# Patient Record
Sex: FEMALE | Race: ASIAN | NOT HISPANIC OR LATINO | URBAN - METROPOLITAN AREA
[De-identification: names, ages, dates, MRNs, and addresses within clinical notes are randomized per-mention and may not be internally consistent; named-entity substitution may affect disease eponyms.]

---
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
## 2019-11-06 ENCOUNTER — APPOINTMENT (OUTPATIENT)
Dept: FAMILY MEDICINE | Facility: CLINIC | Age: 19
End: 2019-11-06
Payer: MEDICAID

## 2019-11-06 VITALS
DIASTOLIC BLOOD PRESSURE: 70 MMHG | OXYGEN SATURATION: 99 % | TEMPERATURE: 98 F | HEIGHT: 63 IN | SYSTOLIC BLOOD PRESSURE: 100 MMHG | BODY MASS INDEX: 22.86 KG/M2 | RESPIRATION RATE: 14 BRPM | WEIGHT: 129 LBS | HEART RATE: 94 BPM

## 2019-11-06 DIAGNOSIS — Z78.9 OTHER SPECIFIED HEALTH STATUS: ICD-10-CM

## 2019-11-06 DIAGNOSIS — Z82.49 FAMILY HISTORY OF ISCHEMIC HEART DISEASE AND OTHER DISEASES OF THE CIRCULATORY SYSTEM: ICD-10-CM

## 2019-11-06 DIAGNOSIS — Z87.09 PERSONAL HISTORY OF OTHER DISEASES OF THE RESPIRATORY SYSTEM: ICD-10-CM

## 2019-11-06 LAB
HCG UR QL: NEGATIVE
QUALITY CONTROL: YES
S PYO AG SPEC QL IA: NEGATIVE

## 2019-11-06 PROCEDURE — 99385 PREV VISIT NEW AGE 18-39: CPT | Mod: 25

## 2019-11-06 PROCEDURE — 87880 STREP A ASSAY W/OPTIC: CPT | Mod: QW

## 2019-11-06 PROCEDURE — 81025 URINE PREGNANCY TEST: CPT

## 2019-11-06 RX ORDER — CLINDAMYCIN 1 G/10ML
1 GEL TOPICAL
Refills: 0 | Status: ACTIVE | COMMUNITY

## 2019-11-11 LAB — BACTERIA THROAT CULT: NORMAL

## 2020-03-05 ENCOUNTER — TRANSCRIPTION ENCOUNTER (OUTPATIENT)
Age: 20
End: 2020-03-05

## 2020-03-05 ENCOUNTER — APPOINTMENT (OUTPATIENT)
Dept: FAMILY MEDICINE | Facility: CLINIC | Age: 20
End: 2020-03-05
Payer: MEDICAID

## 2020-03-05 VITALS
BODY MASS INDEX: 22.15 KG/M2 | DIASTOLIC BLOOD PRESSURE: 90 MMHG | HEIGHT: 63 IN | RESPIRATION RATE: 14 BRPM | WEIGHT: 125 LBS | HEART RATE: 86 BPM | TEMPERATURE: 97.7 F | SYSTOLIC BLOOD PRESSURE: 138 MMHG | OXYGEN SATURATION: 98 %

## 2020-03-05 DIAGNOSIS — Z23 ENCOUNTER FOR IMMUNIZATION: ICD-10-CM

## 2020-03-05 PROCEDURE — 90714 TD VACC NO PRESV 7 YRS+ IM: CPT

## 2020-03-05 PROCEDURE — 90471 IMMUNIZATION ADMIN: CPT

## 2020-03-05 PROCEDURE — 99213 OFFICE O/P EST LOW 20 MIN: CPT | Mod: 25

## 2020-06-18 ENCOUNTER — TRANSCRIPTION ENCOUNTER (OUTPATIENT)
Age: 20
End: 2020-06-18

## 2020-09-23 ENCOUNTER — TRANSCRIPTION ENCOUNTER (OUTPATIENT)
Age: 20
End: 2020-09-23

## 2020-11-12 ENCOUNTER — APPOINTMENT (OUTPATIENT)
Dept: FAMILY MEDICINE | Facility: CLINIC | Age: 20
End: 2020-11-12
Payer: MEDICAID

## 2020-11-12 VITALS
SYSTOLIC BLOOD PRESSURE: 112 MMHG | BODY MASS INDEX: 22.15 KG/M2 | WEIGHT: 125 LBS | OXYGEN SATURATION: 98 % | DIASTOLIC BLOOD PRESSURE: 74 MMHG | TEMPERATURE: 98.1 F | HEART RATE: 89 BPM | RESPIRATION RATE: 18 BRPM | HEIGHT: 63 IN

## 2020-11-12 DIAGNOSIS — F41.9 ANXIETY DISORDER, UNSPECIFIED: ICD-10-CM

## 2020-11-12 DIAGNOSIS — L70.9 ACNE, UNSPECIFIED: ICD-10-CM

## 2020-11-12 DIAGNOSIS — Z00.00 ENCOUNTER FOR GENERAL ADULT MEDICAL EXAMINATION W/OUT ABNORMAL FINDINGS: ICD-10-CM

## 2020-11-12 DIAGNOSIS — Z81.8 FAMILY HISTORY OF OTHER MENTAL AND BEHAVIORAL DISORDERS: ICD-10-CM

## 2020-11-12 DIAGNOSIS — Z83.79 FAMILY HISTORY OF OTHER DISEASES OF THE DIGESTIVE SYSTEM: ICD-10-CM

## 2020-11-12 DIAGNOSIS — F32.9 MAJOR DEPRESSIVE DISORDER, SINGLE EPISODE, UNSPECIFIED: ICD-10-CM

## 2020-11-12 PROCEDURE — 99395 PREV VISIT EST AGE 18-39: CPT | Mod: 25

## 2020-11-12 PROCEDURE — G0442 ANNUAL ALCOHOL SCREEN 15 MIN: CPT | Mod: NC

## 2020-11-12 PROCEDURE — G0444 DEPRESSION SCREEN ANNUAL: CPT | Mod: NC,59

## 2020-11-12 PROCEDURE — 99072 ADDL SUPL MATRL&STAF TM PHE: CPT

## 2020-11-12 RX ORDER — BUSPIRONE HYDROCHLORIDE 10 MG/1
10 TABLET ORAL TWICE DAILY
Qty: 60 | Refills: 0 | Status: ACTIVE | COMMUNITY
Start: 2020-11-12 | End: 1900-01-01

## 2020-11-12 NOTE — HISTORY OF PRESENT ILLNESS
[FreeTextEntry1] : 19 yo female presents to the office for a physical. 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 she reports feeling an increase in anxiety and depression, centered around her school, parents, significant other. She denies any suicidal/homicidal ideations, but states feeling overwhelmed and anxious most days. She has friends she can confide in, but feels that it's not enough. She has been thinking about making an appointment with the mental health clinic at school, but has yet to do so.

## 2020-11-12 NOTE — PHYSICAL EXAM
[Declined Breast Exam] : declined breast exam  [Normal Supraclavicular Nodes] : no supraclavicular lymphadenopathy [Coordination Grossly Intact] : coordination grossly intact [No Focal Deficits] : no focal deficits [Normal Gait] : normal gait [Speech Grossly Normal] : speech grossly normal [Alert and Oriented x3] : oriented to person, place, and time [Normal Mood] : the mood was normal [Normal] : affect was normal and insight and judgment were intact

## 2020-11-12 NOTE — PAST MEDICAL HISTORY
[Menstruating] : menstruating [Approximately ___] : the LMP was approximately [unfilled] [Normal Amount/Duration] : it was of a normal amount and duration [Regular Cycle Intervals] : have been regular

## 2020-11-12 NOTE — HEALTH RISK ASSESSMENT
[Fair] :  ~his/her~ mood as fair [No] : In the past 12 months have you used drugs other than those required for medical reasons? No [1] : 1) Little interest or pleasure doing things for several days (1) [None] : None [With Family] : lives with family [# of Members in Household ___] :  household currently consist of [unfilled] member(s) [Employed] : employed [Student] : student [Significant Other] : lives with significant other [Sexually Active] : sexually active [Feels Safe at Home] : Feels safe at home [Fully functional (bathing, dressing, toileting, transferring, walking, feeding)] : Fully functional (bathing, dressing, toileting, transferring, walking, feeding) [Fully functional (using the telephone, shopping, preparing meals, housekeeping, doing laundry, using] : Fully functional and needs no help or supervision to perform IADLs (using the telephone, shopping, preparing meals, housekeeping, doing laundry, using transportation, managing medications and managing finances) [Reports normal functional visual acuity (ie: able to read med bottle)] : Reports normal functional visual acuity [No falls in past year] : Patient reported no falls in the past year [3] : 2) Feeling down, depressed, or hopeless for nearly every day (3) [FreeTextEntry1] : see hpi [] : No [de-identified] : regular  [de-identified] : healthy [[id number]] : 4 [PSV8Cwjxa] : 12 [High Risk Behavior] : no high risk behavior [Reports changes in hearing] : Reports no changes in hearing [Reports changes in vision] : Reports no changes in vision [PapSmearComments] : appointment in december [de-identified] : mother, father, younger 10yo sister [FreeTextEntry2] : 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, working at [employer]

## 2020-11-25 DIAGNOSIS — Z28.3 UNDERIMMUNIZATION STATUS: ICD-10-CM

## 2020-12-01 ENCOUNTER — NON-APPOINTMENT (OUTPATIENT)
Age: 20
End: 2020-12-01

## 2020-12-03 ENCOUNTER — APPOINTMENT (OUTPATIENT)
Dept: OBGYN | Facility: CLINIC | Age: 20
End: 2020-12-03

## 2020-12-16 ENCOUNTER — NON-APPOINTMENT (OUTPATIENT)
Age: 20
End: 2020-12-16

## 2020-12-18 ENCOUNTER — NON-APPOINTMENT (OUTPATIENT)
Age: 20
End: 2020-12-18

## 2020-12-18 ENCOUNTER — APPOINTMENT (OUTPATIENT)
Dept: FAMILY MEDICINE | Facility: CLINIC | Age: 20
End: 2020-12-18
Payer: MEDICAID

## 2020-12-18 DIAGNOSIS — Z23 ENCOUNTER FOR IMMUNIZATION: ICD-10-CM

## 2020-12-18 PROCEDURE — 90471 IMMUNIZATION ADMIN: CPT

## 2020-12-18 PROCEDURE — 99072 ADDL SUPL MATRL&STAF TM PHE: CPT

## 2020-12-18 PROCEDURE — 90707 MMR VACCINE SC: CPT

## 2020-12-21 PROBLEM — Z87.09 HISTORY OF SORE THROAT: Status: RESOLVED | Noted: 2019-11-06 | Resolved: 2020-12-21

## 2021-01-04 ENCOUNTER — TRANSCRIPTION ENCOUNTER (OUTPATIENT)
Age: 21
End: 2021-01-04

## 2021-01-06 ENCOUNTER — TRANSCRIPTION ENCOUNTER (OUTPATIENT)
Age: 21
End: 2021-01-06

## 2021-01-12 ENCOUNTER — APPOINTMENT (OUTPATIENT)
Dept: FAMILY MEDICINE | Facility: CLINIC | Age: 21
End: 2021-01-12
Payer: MEDICAID

## 2021-01-12 PROCEDURE — 99442: CPT

## 2021-01-12 RX ORDER — SERTRALINE 25 MG/1
25 TABLET, FILM COATED ORAL
Qty: 30 | Refills: 2 | Status: ACTIVE | COMMUNITY
Start: 2021-01-12 | End: 1900-01-01

## 2021-01-12 NOTE — HISTORY OF PRESENT ILLNESS
[Home] : at home, [unfilled] , at the time of the visit. [Medical Office: ([facility])___] : at the medical office located in  [Verbal consent obtained from patient] : the patient, [unfilled] [FreeTextEntry1] : 19 yo female with anxiety (buspar) presents for a telephonic visit to discuss worsening anxiety and depression. [de-identified] : The patient reports that she feels the buspar is no longer helping in the way that she needs. she states feeling an increase in her anxiety, and now feeling "down" and depressed. she is currently in therapy via zoom once a week, and feels that she has a good support system from with her friends. She feels that therapy is very helpful, and she is practicing meditation and mindfulness daily. she does states feeling more anxious, and having a panic attack 1-2 times a week. She denies any suicidal/homicidal ideations, and states that she still engages in physical and social activities, when possible.

## 2021-01-12 NOTE — REVIEW OF SYSTEMS
[Anxiety] : anxiety [Depression] : depression [Negative] : Constitutional [Suicidal] : not suicidal [Insomnia] : no insomnia

## 2021-02-04 ENCOUNTER — TRANSCRIPTION ENCOUNTER (OUTPATIENT)
Age: 21
End: 2021-02-04

## 2021-02-09 ENCOUNTER — TRANSCRIPTION ENCOUNTER (OUTPATIENT)
Age: 21
End: 2021-02-09

## 2021-03-08 ENCOUNTER — TRANSCRIPTION ENCOUNTER (OUTPATIENT)
Age: 21
End: 2021-03-08

## 2021-03-08 RX ORDER — ALPRAZOLAM 0.25 MG/1
0.25 TABLET ORAL
Qty: 7 | Refills: 0 | Status: ACTIVE | COMMUNITY
Start: 2021-01-12 | End: 1900-01-01

## 2021-03-12 ENCOUNTER — TRANSCRIPTION ENCOUNTER (OUTPATIENT)
Age: 21
End: 2021-03-12

## 2021-03-25 ENCOUNTER — TRANSCRIPTION ENCOUNTER (OUTPATIENT)
Age: 21
End: 2021-03-25

## 2021-03-29 ENCOUNTER — APPOINTMENT (OUTPATIENT)
Dept: FAMILY MEDICINE | Facility: CLINIC | Age: 21
End: 2021-03-29
Payer: MEDICAID

## 2021-03-29 DIAGNOSIS — F41.9 ANXIETY DISORDER, UNSPECIFIED: ICD-10-CM

## 2021-03-29 DIAGNOSIS — F32.9 ANXIETY DISORDER, UNSPECIFIED: ICD-10-CM

## 2021-03-29 PROCEDURE — 99443: CPT

## 2021-03-29 RX ORDER — SERTRALINE HYDROCHLORIDE 50 MG/1
50 TABLET, FILM COATED ORAL DAILY
Qty: 30 | Refills: 5 | Status: ACTIVE | COMMUNITY
Start: 2021-03-29 | End: 1900-01-01

## 2021-03-30 PROBLEM — F41.9 ANXIETY AND DEPRESSION: Status: ACTIVE | Noted: 2021-01-12

## 2021-03-30 NOTE — HISTORY OF PRESENT ILLNESS
[Home] : at home, [unfilled] , at the time of the visit. [Medical Office: ([facility])___] : at the medical office located in  [Verbal consent obtained from patient] : the patient, [unfilled] [FreeTextEntry8] : 20 yo female with a PMHx anxiety/depression (sertraline, alprazolam) presents for a telephonic visit to discuss anxiety and depression. the patient reports that her symptoms of anxiety have lessened, but she is feeling more depressed lately. she reports that she got into a fight with her parents today, and she was kicked out of her home. She has contacted some close friends, and states that they will allow her to stay with them if/when she needs. she reports feeling an increase in depression before the incident with her parents today, but that this is really "bringing me down" she admits to passive thoughts of hurting herself, denies any self harm, or any plan to do so. the patient is currently in therapy via telehealth once a week, and she already spoke with her therapist this morning. She is planning to call the therapist back after our current appointment, and reach out to the emergency help line for guidance/assistance.

## 2021-04-02 ENCOUNTER — TRANSCRIPTION ENCOUNTER (OUTPATIENT)
Age: 21
End: 2021-04-02

## 2021-04-17 ENCOUNTER — EMERGENCY (EMERGENCY)
Facility: HOSPITAL | Age: 21
LOS: 1 days | Discharge: HOME | End: 2021-04-17
Attending: EMERGENCY MEDICINE
Payer: MEDICAID

## 2021-04-17 VITALS
TEMPERATURE: 99 F | RESPIRATION RATE: 18 BRPM | SYSTOLIC BLOOD PRESSURE: 145 MMHG | DIASTOLIC BLOOD PRESSURE: 88 MMHG | HEART RATE: 88 BPM | OXYGEN SATURATION: 99 %

## 2021-04-17 VITALS
DIASTOLIC BLOOD PRESSURE: 98 MMHG | TEMPERATURE: 99 F | HEART RATE: 88 BPM | SYSTOLIC BLOOD PRESSURE: 151 MMHG | OXYGEN SATURATION: 98 % | RESPIRATION RATE: 18 BRPM

## 2021-04-17 DIAGNOSIS — F32.9 MAJOR DEPRESSIVE DISORDER, SINGLE EPISODE, UNSPECIFIED: ICD-10-CM

## 2021-04-17 LAB
ANION GAP SERPL CALC-SCNC: 12 MMOL/L — SIGNIFICANT CHANGE UP (ref 5–17)
APAP SERPL-MCNC: <15 UG/ML — SIGNIFICANT CHANGE UP (ref 10–30)
APPEARANCE UR: CLEAR — SIGNIFICANT CHANGE UP
BASOPHILS # BLD AUTO: 0.03 K/UL — SIGNIFICANT CHANGE UP (ref 0–0.2)
BASOPHILS NFR BLD AUTO: 0.2 % — SIGNIFICANT CHANGE UP (ref 0–2)
BILIRUB UR-MCNC: NEGATIVE — SIGNIFICANT CHANGE UP
BUN SERPL-MCNC: 10 MG/DL — SIGNIFICANT CHANGE UP (ref 7–23)
CALCIUM SERPL-MCNC: 9 MG/DL — SIGNIFICANT CHANGE UP (ref 8.4–10.5)
CHLORIDE SERPL-SCNC: 99 MMOL/L — SIGNIFICANT CHANGE UP (ref 96–108)
CO2 SERPL-SCNC: 23 MMOL/L — SIGNIFICANT CHANGE UP (ref 22–31)
COLOR SPEC: SIGNIFICANT CHANGE UP
COVID-19 SPIKE DOMAIN AB INTERP: NEGATIVE — SIGNIFICANT CHANGE UP
COVID-19 SPIKE DOMAIN ANTIBODY RESULT: 0.4 U/ML — SIGNIFICANT CHANGE UP
CREAT SERPL-MCNC: 0.53 MG/DL — SIGNIFICANT CHANGE UP (ref 0.5–1.3)
DIFF PNL FLD: NEGATIVE — SIGNIFICANT CHANGE UP
EOSINOPHIL # BLD AUTO: 0.02 K/UL — SIGNIFICANT CHANGE UP (ref 0–0.5)
EOSINOPHIL NFR BLD AUTO: 0.1 % — SIGNIFICANT CHANGE UP (ref 0–6)
ETHANOL SERPL-MCNC: SIGNIFICANT CHANGE UP MG/DL (ref 0–10)
GLUCOSE SERPL-MCNC: 115 MG/DL — HIGH (ref 70–99)
GLUCOSE UR QL: NEGATIVE — SIGNIFICANT CHANGE UP
HCG SERPL-ACNC: <2 MIU/ML — SIGNIFICANT CHANGE UP
HCT VFR BLD CALC: 42.2 % — SIGNIFICANT CHANGE UP (ref 34.5–45)
HGB BLD-MCNC: 13.6 G/DL — SIGNIFICANT CHANGE UP (ref 11.5–15.5)
IMM GRANULOCYTES NFR BLD AUTO: 0.5 % — SIGNIFICANT CHANGE UP (ref 0–1.5)
KETONES UR-MCNC: NEGATIVE — SIGNIFICANT CHANGE UP
LEUKOCYTE ESTERASE UR-ACNC: NEGATIVE — SIGNIFICANT CHANGE UP
LYMPHOCYTES # BLD AUTO: 19.2 % — SIGNIFICANT CHANGE UP (ref 13–44)
LYMPHOCYTES # BLD AUTO: 2.59 K/UL — SIGNIFICANT CHANGE UP (ref 1–3.3)
MCHC RBC-ENTMCNC: 28.7 PG — SIGNIFICANT CHANGE UP (ref 27–34)
MCHC RBC-ENTMCNC: 32.2 GM/DL — SIGNIFICANT CHANGE UP (ref 32–36)
MCV RBC AUTO: 89 FL — SIGNIFICANT CHANGE UP (ref 80–100)
MONOCYTES # BLD AUTO: 0.72 K/UL — SIGNIFICANT CHANGE UP (ref 0–0.9)
MONOCYTES NFR BLD AUTO: 5.3 % — SIGNIFICANT CHANGE UP (ref 2–14)
NEUTROPHILS # BLD AUTO: 10.04 K/UL — HIGH (ref 1.8–7.4)
NEUTROPHILS NFR BLD AUTO: 74.7 % — SIGNIFICANT CHANGE UP (ref 43–77)
NITRITE UR-MCNC: NEGATIVE — SIGNIFICANT CHANGE UP
NRBC # BLD: 0 /100 WBCS — SIGNIFICANT CHANGE UP (ref 0–0)
PCP SPEC-MCNC: SIGNIFICANT CHANGE UP
PH UR: 6 — SIGNIFICANT CHANGE UP (ref 5–8)
PLATELET # BLD AUTO: 286 K/UL — SIGNIFICANT CHANGE UP (ref 150–400)
POTASSIUM SERPL-MCNC: 3.8 MMOL/L — SIGNIFICANT CHANGE UP (ref 3.5–5.3)
POTASSIUM SERPL-SCNC: 3.8 MMOL/L — SIGNIFICANT CHANGE UP (ref 3.5–5.3)
PROT UR-MCNC: NEGATIVE — SIGNIFICANT CHANGE UP
RBC # BLD: 4.74 M/UL — SIGNIFICANT CHANGE UP (ref 3.8–5.2)
RBC # FLD: 12.5 % — SIGNIFICANT CHANGE UP (ref 10.3–14.5)
SALICYLATES SERPL-MCNC: <2 MG/DL — LOW (ref 15–30)
SARS-COV-2 IGG+IGM SERPL QL IA: 0.4 U/ML — SIGNIFICANT CHANGE UP
SARS-COV-2 IGG+IGM SERPL QL IA: NEGATIVE — SIGNIFICANT CHANGE UP
SARS-COV-2 RNA SPEC QL NAA+PROBE: SIGNIFICANT CHANGE UP
SODIUM SERPL-SCNC: 134 MMOL/L — LOW (ref 135–145)
SP GR SPEC: 1.01 — SIGNIFICANT CHANGE UP (ref 1.01–1.02)
TSH SERPL-MCNC: 2.38 UIU/ML — SIGNIFICANT CHANGE UP (ref 0.27–4.2)
UROBILINOGEN FLD QL: NEGATIVE — SIGNIFICANT CHANGE UP
WBC # BLD: 13.47 K/UL — HIGH (ref 3.8–10.5)
WBC # FLD AUTO: 13.47 K/UL — HIGH (ref 3.8–10.5)

## 2021-04-17 PROCEDURE — 99285 EMERGENCY DEPT VISIT HI MDM: CPT

## 2021-04-17 PROCEDURE — 86769 SARS-COV-2 COVID-19 ANTIBODY: CPT

## 2021-04-17 PROCEDURE — 93005 ELECTROCARDIOGRAM TRACING: CPT

## 2021-04-17 PROCEDURE — 90792 PSYCH DIAG EVAL W/MED SRVCS: CPT | Mod: 95

## 2021-04-17 PROCEDURE — 84443 ASSAY THYROID STIM HORMONE: CPT

## 2021-04-17 PROCEDURE — 84702 CHORIONIC GONADOTROPIN TEST: CPT

## 2021-04-17 PROCEDURE — 85025 COMPLETE CBC W/AUTO DIFF WBC: CPT

## 2021-04-17 PROCEDURE — 87635 SARS-COV-2 COVID-19 AMP PRB: CPT

## 2021-04-17 PROCEDURE — 80048 BASIC METABOLIC PNL TOTAL CA: CPT

## 2021-04-17 PROCEDURE — 80307 DRUG TEST PRSMV CHEM ANLYZR: CPT

## 2021-04-17 PROCEDURE — 81003 URINALYSIS AUTO W/O SCOPE: CPT

## 2021-04-17 PROCEDURE — 99212 OFFICE O/P EST SF 10 MIN: CPT

## 2021-04-17 NOTE — ED PROVIDER NOTE - PHYSICAL EXAMINATION
PGY3/MD Shea.   VITALS: reviewed  GEN: No apparent distress, A & O x 4  HEAD/EYES: NC/AT, PERRL, EOMI, anicteric sclerae, no conjunctival pallor  ENT: mucus membranes moist, oropharynx WNL, trachea midline, no JVD, neck is supple  RESP: lungs CTA with equal breath sounds bilaterally, chest wall nontender and atraumatic  CV: heart with reg rhythm S1, S2, no murmur; distal pulses intact and symmetric bilaterally  ABDOMEN: normoactive bowel sounds, soft, nondistended, nontender  MSK: extremities atraumatic and nontender, no edema, no asymmetry. the back is without midline or lateral tenderness, there is no spinal deformity or stepoff and the back is ranged painlessly. the neck has no midline tenderness, deformity, or stepoff, and is ranged painlessly.  SKIN: warm, dry, no rash, no bruising, no cyanosis. color appropriate for ethnicity  NEURO: alert, mentating appropriately, no facial asymmetry. gross sensation, motor, coordination are intact  PSYCH: Affect appropriate

## 2021-04-17 NOTE — ED BEHAVIORAL HEALTH NOTE - BEHAVIORAL HEALTH NOTE
PRE-HOSPITAL COURSE  ===================  SOURCE:  Second-hand information via EMR documentation and primary RNEphraim   DETAILS: Patient was BIB EMS from home after attempting to hang herself in the shower    ===================  ED COURSE:   SOURCE:  Second-hand information via EMR documentation and primary RNEphraim   ARRIVAL:  Patient was BIB EMS    BELONGINGS:  Clothing   BEHAVIOR: Complied with triage protocols –provided blood/urine, changed into a hospital gown, and allowed staff to wand/collect belongings without incident. Per chart, patient presented to the ED after attempting to hang herself with a shower curtain. RN notes superficial red marks around patient’s neck.  Patient denies current SI to ED RN and has not attempted to harm herself. Patient denies HI, denies AH, and denies VH. Patient is noted to be dysphoric with mood congruent affect and speech is at a normal rate/volume. She appears well-kempt and maintains eye-contact. Patient presents with a linear thought process and is A&Ox3. RN stated that patient has spent majority of time in the ED laying on the stretcher sleeping; no aggression or behavioral issues reported.   TREATMENT: No prn medications, restraints, security interventions or manual holds required.   VISITORS: Patient is unaccompanied by family or social supports.

## 2021-04-17 NOTE — ED PROVIDER NOTE - CLINICAL SUMMARY MEDICAL DECISION MAKING FREE TEXT BOX
PGY3/MD Shea. 20 yo F, recently started on Busropion, p/w suicidal attempt, unsucceeded. pt denies active thought, did not mean too much but given the fact that pt tried hanging, high risk attempt, will get psychi consutl, after lab, ekg. no neck injury, no signs of hangman fracture, clinically.

## 2021-04-17 NOTE — ED BEHAVIORAL HEALTH ASSESSMENT NOTE - DESCRIPTION
lives with family. from [place] and came to [place] at 5yo. 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 and she also received J&J vaccine last Wednesday. denies

## 2021-04-17 NOTE — ED PROVIDER NOTE - PATIENT PORTAL LINK FT
You can access the FollowMyHealth Patient Portal offered by [facility] by registering at the following website: [email or web address]. By joining [facility]’s FollowMyHealth portal, you will also be able to view your health information using other applications (apps) compatible with our system.

## 2021-04-17 NOTE — ED PROVIDER NOTE - NSFOLLOWUPINSTRUCTIONS_ED_ALL_ED_FT
Your diagnosis: psychiatry evaluation    We had our psychiatrist evaluate you, who cleared you for discharge.     Discharge instructions:    - Please follow up with a psychiatrist in the next few days.    - Be sure to return to the ED if you develop new or worsening symptoms. Specific signs and symptoms to be vigilant of: hallucinations, thoughts of hurting yourself or other people, severe depression or anxiety. Your diagnosis: psychiatry evaluation    We had our psychiatrist evaluate you, who cleared you for discharge.     Discharge instructions:    - Please follow up with a psychiatrist in the next few days. You can also follow up with our behavior crisis center.    - Be sure to return to the ED if you develop new or worsening symptoms. Specific signs and symptoms to be vigilant of: hallucinations, thoughts of hurting yourself or other people, severe depression or anxiety.

## 2021-04-17 NOTE — ED PROVIDER NOTE - PROGRESS NOTE DETAILS
PGY3/MD Shea. psychi consult has been placed, the pt does not want to see the mother, mother's contact information is in the chart, signed out the collateral situation to psychiatric attg. pending interview. Adam: patient endorsed to me from sign-out. patient cleared by psych for discharge. Attending MD Ramos: VENKATA reports that Dr. Moya (Psych) reported would clear patient.  Patient awaiting documentation of such for discharge.  Patient evaluated by this MD.  Denies physical complaints.  Denies SI/HI at present.  Tolerating po.  Reports has plan to go with boyfriend after discharge.  Reports feels safe with him.  Will await updated Psych note. Attending MD Ramos: Updated behavioral note reviewed, patient cleared for discharge from Psych standpoint.  Stable for discharge.

## 2021-04-17 NOTE — ED BEHAVIORAL HEALTH ASSESSMENT NOTE - HPI (INCLUDE ILLNESS QUALITY, SEVERITY, DURATION, TIMING, CONTEXT, MODIFYING FACTORS, ASSOCIATED SIGNS AND SYMPTOMS)
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     note states that pt put towel around her neck and the shower curtain, however shower ji could not support her shaheed. pt states that she actually wrapped metal shower hear around her neck. the pt states that she has  been depressed for a while because parents are strict and state that she should not date her boyfriend, cannot go over his house and must be home by early curfew. she chronically argues with them but states that she sees a future with he  of 1 year so she feels torn about what to do. she denies that she had been having SI prior today. pt states that argument escalated and father asked her to leave the house. pt then went to the bathroom and wrapped metal shower head around her neck. she states that father entered the bathroom and just stared but did intervene. pt found it difficult to articulate exactly what she was feeling and was minimizing. she denies wrapping anything around the shower ji. she said that she stood up for just 10 secs with the cord around her neck, yet there were red marks around her neck as per nurse, and areas of scant bruising around anterior of neck her em attending. pt then called her  and then the after hours therapist line at school and was told to come to the ER for an evaluation.    pt is denying any HI or symptoms of martir or psychosis.  she denies SI now but cannot say what changed. she wants to go to 's house for a while, however does not recognize that family forbids her from even visiting her house and that she will need to collect her belongings.

## 2021-04-17 NOTE — ED PROVIDER NOTE - ATTENDING CONTRIBUTION TO CARE
MD Soto:  patient seen and evaluated personally.   I agree with the History & Physical,  Impression & Plan other than what was detailed in my note.  MD Soto  22 y/o f hx of anxiety, depression on buspirone, sertraline, alprazolam prn, presents to after attempting to hang herself with shower curtain. Reports wrapping around neck and then curtain broke, denies neck pain, cp, wheezing ,sob, difficulty swallowing, states did this in "spur of the moment" as she had an argument with her father. Denies si/ha, hallucinations, drug use, od on meds/other meds, or hx of sa in past. Pt states she lives with parents and relationship has been strained over past year. states they do not approve of her boyfriend. afebrile vitals stable.  non toxic well appearing, NC/AT, heart sounds, conjunctiva non conjected, sclera anicteric, moist mucous membranes, neck supple, scant eccymosis over anterior of neck, no swelling/no bruitis, no pain w/ palp, normal, no mrg, lungs cta b/l no wrr, abd soft non distended w/ no tenderness, no visual deformities of extremities, axox4, , normal mood and affect, skin w/ no s/o cutting. will get psych workup and have seen by psych. dispo as per their team.

## 2021-04-17 NOTE — ED PROVIDER NOTE - OBJECTIVE STATEMENT
PGY3/MD Shea. 20 yo F with no PMH, stared on Busropion x 3 months by PCP for anxiety?, p/w suicidal attempt. Pt got in an argument with father, frequently has that but today she was overwhelmed, pt took the curtain towel around the neck, the curtain rail fell down and attempt was failed. Pt lives with parents, sister, having a boy friend, sexually active with OCD, regular period, 2 wks ago. denies drug use, used a cup of alcohol tonight at the dinner. PGY3/MD Shea. 22 yo F with no PMH, stared on Buspironex 3 months by PCP for anxiety?, p/w suicidal attempt. Pt got in an argument with father, frequently has that but today she was overwhelmed, pt took the curtain towel around the neck, the curtain rail fell down and attempt was failed. Pt lives with parents, sister, having a boy friend, sexually active with OCD, regular period, 2 wks ago. denies drug use, used a cup of alcohol tonight at the dinner. PGY3/MD Shea. 22 yo F with no PMH, stared on Buspironex 3 months by PCP for anxiety?, p/w suicidal attempt. Pt got in an argument with father, frequently has that but today she was overwhelmed, pt took the curtain towel around the neck, the curtain rail fell down and attempt was failed. Pt lives with parents, sister, having a boy friend, sexually active with OCD, regular period, 2 wks ago. denies drug use, used a cup of alcohol tonight at the dinner.    Collateral, geri (mother) [phone], almas (father) [phone]

## 2021-04-17 NOTE — ED BEHAVIORAL HEALTH ASSESSMENT NOTE - SUMMARY
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     the pt is presenting s/p wrapping shower head around her neck with some visible marks seen in the ER. pt denying SI now, but appears minimizing and superficial during assessment. this occurred in the context of escalating stressors at home with parents. there is concern that pt will be discharged back into crisis situation; and that during this crisis last night, family did not activate 911, and pt harboring feelings that family did not react to her. at this point the pt is rejecting voluntary admission. determined that pt is NOT safe for discharge home.  collateral cannot be reached, BF nor therapist at this time. given case was seen close to handoff time, day team to attempt to contact family/bf to clarify actual events to further support involuntary admission; legals; bed search.

## 2021-04-17 NOTE — CHART NOTE - NSCHARTNOTEFT_GEN_A_CORE
ED WEEKEND SOCIAL WORK COVERAGE:     Social work referred to patient in ED for high risk screening and supportive intervention, Chart reviewed: "20 yo F with no PMH, stared on Buspironex 3 months by PCP for anxiety?, p/w suicidal attempt. Pt got in an argument with father, frequently has that but today she was overwhelmed, pt took the curtain towel around the neck, the curtain rail fell down and attempt was failed." Patient medically cleared and reevaluated by Psych MD today.  LMSW inquired earlier this morning about psych beds, SO provided with handoff. Self and role introduced at bedside, patient receptive to conversation with LMSW. Demographics confirmed. Prior to admission patient independent with ADLs and ambulation. Resides with parents in private home. Patient with significant other, whom she states that she plans to move in with. Patient with no children, currently as student and works at [employer]. Patient with active health insurance- [employer]. Denied any financial concerns.    Psych MD reevaluated patient, "Patient declines voluntary admission, and does not currently meet criteria for involuntary inpatient hospitalization at this time." As per Attending MD and Psych, patient cleared for discharge from ED.   LMSW met with patient at bedside to review discharge planning from ED. Patient on the phone with significant other and reports that he will provide transportation from ED. Patient declining assistance from LMSW with contacting family, Psych MD reports parents aware of patient's discharge from ED and in agreement. Patient reporting no concerns for LMSW and with contact information for [facility].  ED Psych RN aware. LMSW remains available,

## 2021-04-17 NOTE — ED PROVIDER NOTE - SHIFT CHANGE DETAILS
Attending MD Ramos: 21F was fighting with parents, made suicidal gesture with curtain, trace ecchymosis at neck, no indication for CT neck at time of eval, pending psych, cleared from medical standpoint

## 2021-04-17 NOTE — ED ADULT NURSE REASSESSMENT NOTE - NS ED NURSE REASSESS COMMENT FT1
assessed patient at start of shift. She is a&ox3 in no distress. Denies SI/HI at this time. States if she is discharged she will live with her boyfriend and his family. Makes  eye contact, cooperative and has appropriate mood.

## 2021-04-17 NOTE — ED BEHAVIORAL HEALTH ASSESSMENT NOTE - PSYCHIATRIC ISSUES AND PLAN (INCLUDE STANDING AND PRN MEDICATION)
PRNS: haldol 5mg, ativan 2mg, diphenhydramine 50mg, PO/IM, Q6H for Agitation. continue home medications

## 2021-04-17 NOTE — ED ADULT NURSE NOTE - OBJECTIVE STATEMENT
21 yo female presents to ED via EMS from after attempting to harm herself by wrapping shower curtain around her neck and hanging herself on the shower ji, pt sustained superficial redness around the neck. pt stated getting into argument with her father and being stressed with school and was overwhelmed with emotion. Pt stated never having suicidal ideation in the past or the intent to harms self before tonight, pt denied any thoughts of harming someone else or ever making a plan to harm her self. Pt belongings taken and locked in safe, security wanded patient, and pt butterflied for blood work, Pt resting comfortably in NAD calm and cooperative.

## 2021-04-17 NOTE — ED BEHAVIORAL HEALTH ASSESSMENT NOTE - REASON
pt not safe for discharge home. should obtain collateral, although there is evidence that pt did indeed attempt to hang herself

## 2021-04-19 ENCOUNTER — NON-APPOINTMENT (OUTPATIENT)
Age: 21
End: 2021-04-19

## 2021-04-21 LAB — DRUG SCREEN, SERUM: SIGNIFICANT CHANGE UP

## 2021-04-22 ENCOUNTER — TRANSCRIPTION ENCOUNTER (OUTPATIENT)
Age: 21
End: 2021-04-22

## 2021-04-22 ENCOUNTER — RX RENEWAL (OUTPATIENT)
Age: 21
End: 2021-04-22

## 2021-04-27 ENCOUNTER — TRANSCRIPTION ENCOUNTER (OUTPATIENT)
Age: 21
End: 2021-04-27

## 2021-06-23 ENCOUNTER — TRANSCRIPTION ENCOUNTER (OUTPATIENT)
Age: 21
End: 2021-06-23

## 2021-09-07 ENCOUNTER — TRANSCRIPTION ENCOUNTER (OUTPATIENT)
Age: 21
End: 2021-09-07

## 2021-09-07 RX ORDER — BUSPIRONE HYDROCHLORIDE 15 MG/1
15 TABLET ORAL
Qty: 60 | Refills: 3 | Status: ACTIVE | COMMUNITY
Start: 2020-12-18 | End: 1900-01-01

## 2021-10-05 RX ORDER — NORGESTIMATE AND ETHINYL ESTRADIOL 7DAYSX3 LO
0.18/0.215/0.25 KIT ORAL DAILY
Qty: 3 | Refills: 1 | Status: ACTIVE | COMMUNITY
Start: 2019-11-06 | End: 1900-01-01

## 2022-06-17 NOTE — ED BEHAVIORAL HEALTH ASSESSMENT NOTE - KNOWN PSYCHIATRIC ADMISSION WITHIN THE PAST 30 DAYS
"    No

## 2023-05-07 NOTE — ED PROVIDER NOTE - NSFOLLOWUPCLINICS_GEN_ALL_ED_FT
lacerations [facility]  Behavioral Health  [address] [place] [address]  Phone: [phone]  Fax:

## 2023-08-05 NOTE — ED BEHAVIORAL HEALTH ASSESSMENT NOTE - THOUGHT ASSOCIATIONS
Treatment Plan:  Referral for [facility] pharmacist.     Continue prazosin 2 mg at bedtime, mirtazapine (Remeron) 7.5 mg at bedtime, lithium 900 mg at bedtime, Lunesta 3 mg at bedtime and aripiprazole (Abilify) 15 mg daily.     Follow up as needed. Refills from primary care provider.     - Recommend patient discuss medications with their pharmacist. Risks and benefits for medications were discussed including, but not limited to, side effects.   - Safety plan was reviewed; to the ER as needed or call after hours crisis line; [phone]  - Education and counseling was done regarding use of medications, psychotherapy options  - Call [phone] for appointment or to speak to a nurse.    -Office hours: Monday through Thursday 8:00 am to 4:30 pm.      General Back Pain Normal
